# Patient Record
Sex: FEMALE | NOT HISPANIC OR LATINO | Employment: UNEMPLOYED | URBAN - METROPOLITAN AREA
[De-identification: names, ages, dates, MRNs, and addresses within clinical notes are randomized per-mention and may not be internally consistent; named-entity substitution may affect disease eponyms.]

---

## 2023-05-23 LAB
ALANINE AMINOTRANSFERASE (SGPT) (U/L) IN SER/PLAS: 17 U/L (ref 7–45)
ALBUMIN (G/DL) IN SER/PLAS: 4.4 G/DL (ref 3.4–5)
ALKALINE PHOSPHATASE (U/L) IN SER/PLAS: 48 U/L (ref 33–110)
ANION GAP IN SER/PLAS: 13 MMOL/L (ref 10–20)
ASPARTATE AMINOTRANSFERASE (SGOT) (U/L) IN SER/PLAS: 14 U/L (ref 9–39)
BILIRUBIN TOTAL (MG/DL) IN SER/PLAS: 0.7 MG/DL (ref 0–1.2)
CALCIUM (MG/DL) IN SER/PLAS: 9.5 MG/DL (ref 8.6–10.6)
CARBON DIOXIDE, TOTAL (MMOL/L) IN SER/PLAS: 33 MMOL/L (ref 21–32)
CHLORIDE (MMOL/L) IN SER/PLAS: 98 MMOL/L (ref 98–107)
CHOLESTEROL (MG/DL) IN SER/PLAS: 149 MG/DL (ref 0–199)
CHOLESTEROL IN HDL (MG/DL) IN SER/PLAS: 58 MG/DL
CHOLESTEROL/HDL RATIO: 2.6
CREATININE (MG/DL) IN SER/PLAS: 0.9 MG/DL (ref 0.5–1.05)
ERYTHROCYTE DISTRIBUTION WIDTH (RATIO) BY AUTOMATED COUNT: 12.9 % (ref 11.5–14.5)
ERYTHROCYTE MEAN CORPUSCULAR HEMOGLOBIN CONCENTRATION (G/DL) BY AUTOMATED: 32 G/DL (ref 32–36)
ERYTHROCYTE MEAN CORPUSCULAR VOLUME (FL) BY AUTOMATED COUNT: 91 FL (ref 80–100)
ERYTHROCYTES (10*6/UL) IN BLOOD BY AUTOMATED COUNT: 5.06 X10E12/L (ref 4–5.2)
ESTIMATED AVERAGE GLUCOSE FOR HBA1C: 126 MG/DL
GFR FEMALE: 73 ML/MIN/1.73M2
GLUCOSE (MG/DL) IN SER/PLAS: 99 MG/DL (ref 74–99)
HEMATOCRIT (%) IN BLOOD BY AUTOMATED COUNT: 45.9 % (ref 36–46)
HEMOGLOBIN (G/DL) IN BLOOD: 14.7 G/DL (ref 12–16)
HEMOGLOBIN A1C/HEMOGLOBIN TOTAL IN BLOOD: 6 %
LDL: 48 MG/DL (ref 0–99)
LEUKOCYTES (10*3/UL) IN BLOOD BY AUTOMATED COUNT: 5.5 X10E9/L (ref 4.4–11.3)
NON HDL CHOLESTEROL: 91 MG/DL
NRBC (PER 100 WBCS) BY AUTOMATED COUNT: 0 /100 WBC (ref 0–0)
PLATELETS (10*3/UL) IN BLOOD AUTOMATED COUNT: 198 X10E9/L (ref 150–450)
POTASSIUM (MMOL/L) IN SER/PLAS: 3.7 MMOL/L (ref 3.5–5.3)
PROTEIN TOTAL: 7.3 G/DL (ref 6.4–8.2)
SODIUM (MMOL/L) IN SER/PLAS: 140 MMOL/L (ref 136–145)
TRIGLYCERIDE (MG/DL) IN SER/PLAS: 216 MG/DL (ref 0–149)
UREA NITROGEN (MG/DL) IN SER/PLAS: 16 MG/DL (ref 6–23)
VLDL: 43 MG/DL (ref 0–40)

## 2023-05-25 ENCOUNTER — OFFICE VISIT (OUTPATIENT)
Dept: PRIMARY CARE | Facility: CLINIC | Age: 58
End: 2023-05-25
Payer: COMMERCIAL

## 2023-05-25 VITALS
HEIGHT: 65 IN | SYSTOLIC BLOOD PRESSURE: 124 MMHG | BODY MASS INDEX: 39.12 KG/M2 | WEIGHT: 234.8 LBS | DIASTOLIC BLOOD PRESSURE: 66 MMHG

## 2023-05-25 DIAGNOSIS — R73.03 BORDERLINE DIABETES: ICD-10-CM

## 2023-05-25 DIAGNOSIS — E78.2 MIXED HYPERLIPIDEMIA: ICD-10-CM

## 2023-05-25 DIAGNOSIS — I10 PRIMARY HYPERTENSION: Primary | ICD-10-CM

## 2023-05-25 PROBLEM — J32.9 SINUS INFECTION: Status: ACTIVE | Noted: 2023-05-25

## 2023-05-25 PROBLEM — M19.90 ARTHRITIS: Status: ACTIVE | Noted: 2023-05-25

## 2023-05-25 PROBLEM — E66.9 OBESITY, CLASS II, BMI 35-39.9: Status: ACTIVE | Noted: 2019-08-08

## 2023-05-25 PROBLEM — R21 RASH: Status: ACTIVE | Noted: 2023-05-25

## 2023-05-25 PROBLEM — Z96.651 STATUS POST RIGHT KNEE REPLACEMENT: Status: ACTIVE | Noted: 2019-08-15

## 2023-05-25 PROBLEM — G89.18 ACUTE POST-OPERATIVE PAIN: Status: ACTIVE | Noted: 2019-09-10

## 2023-05-25 PROBLEM — E66.812 OBESITY, CLASS II, BMI 35-39.9: Status: ACTIVE | Noted: 2019-08-08

## 2023-05-25 PROBLEM — R26.9 ABNORMALITY OF GAIT: Status: ACTIVE | Noted: 2019-09-10

## 2023-05-25 PROBLEM — E66.9 OBESITY (BMI 30-39.9): Status: ACTIVE | Noted: 2019-08-08

## 2023-05-25 PROBLEM — M17.11 PRIMARY OSTEOARTHRITIS OF RIGHT KNEE: Status: ACTIVE | Noted: 2020-09-28

## 2023-05-25 PROBLEM — M25.662 DECREASED ROM OF LEFT KNEE: Status: ACTIVE | Noted: 2019-09-10

## 2023-05-25 PROCEDURE — 99213 OFFICE O/P EST LOW 20 MIN: CPT | Performed by: INTERNAL MEDICINE

## 2023-05-25 PROCEDURE — 3074F SYST BP LT 130 MM HG: CPT | Performed by: INTERNAL MEDICINE

## 2023-05-25 PROCEDURE — 1036F TOBACCO NON-USER: CPT | Performed by: INTERNAL MEDICINE

## 2023-05-25 PROCEDURE — 3078F DIAST BP <80 MM HG: CPT | Performed by: INTERNAL MEDICINE

## 2023-05-25 RX ORDER — AMLODIPINE BESYLATE 10 MG/1
10 TABLET ORAL DAILY
COMMUNITY
End: 2023-05-25 | Stop reason: SDUPTHER

## 2023-05-25 RX ORDER — HYDROCHLOROTHIAZIDE 25 MG/1
1 TABLET ORAL DAILY
COMMUNITY
Start: 2022-02-26 | End: 2023-05-25 | Stop reason: SDUPTHER

## 2023-05-25 RX ORDER — AMLODIPINE BESYLATE 10 MG/1
10 TABLET ORAL DAILY
Qty: 90 TABLET | Refills: 1 | Status: SHIPPED | OUTPATIENT
Start: 2023-05-25 | End: 2023-11-30 | Stop reason: SDUPTHER

## 2023-05-25 RX ORDER — HYDROCHLOROTHIAZIDE 25 MG/1
25 TABLET ORAL DAILY
Qty: 90 TABLET | Refills: 1 | Status: SHIPPED | OUTPATIENT
Start: 2023-05-25 | End: 2023-11-30 | Stop reason: SDUPTHER

## 2023-05-25 RX ORDER — ATORVASTATIN CALCIUM 10 MG/1
10 TABLET, FILM COATED ORAL DAILY
COMMUNITY
Start: 2023-02-09 | End: 2023-05-25 | Stop reason: SDUPTHER

## 2023-05-25 RX ORDER — ATENOLOL 100 MG/1
100 TABLET ORAL DAILY
COMMUNITY
End: 2023-05-25 | Stop reason: SDUPTHER

## 2023-05-25 RX ORDER — ATENOLOL 100 MG/1
100 TABLET ORAL DAILY
Qty: 90 TABLET | Refills: 1 | Status: SHIPPED | OUTPATIENT
Start: 2023-05-25 | End: 2023-11-30 | Stop reason: SDUPTHER

## 2023-05-25 RX ORDER — ATORVASTATIN CALCIUM 10 MG/1
10 TABLET, FILM COATED ORAL DAILY
Qty: 90 TABLET | Refills: 1 | Status: SHIPPED | OUTPATIENT
Start: 2023-05-25 | End: 2023-11-30 | Stop reason: SINTOL

## 2023-05-25 RX ORDER — METFORMIN HYDROCHLORIDE 500 MG/1
500 TABLET ORAL
Qty: 90 TABLET | Refills: 1 | Status: SHIPPED | OUTPATIENT
Start: 2023-05-25 | End: 2023-11-30 | Stop reason: SDUPTHER

## 2023-05-25 NOTE — PROGRESS NOTES
"Subjective   Patient ID: Nakita Llamas is a 57 y.o. female who presents for Med Refill (Refills, bloodwork results).    HPI   Patient is here for follow-up on hypertension  She has gained more weight  Not able to watch diet and exercise  Did not do blood work could not do CT cardiac scoring     past recap  Establish PCP  For physical  Patient has multiple concerns  Feels sinus infections coming for past 3 weeks Covid was negative not having had cold or postnasal drainage she did got treatment with amoxicillin from urgent care still she is having colored mucus which cleared up for a while  Complaining of rash     Past medical history: Bilateral total knee replacement 2019 in 2020, hypertension  Social history: Quit smoking 9 years ago ,2 drinks a night , vodka  Family history: Father committed suicide mother  of coronary artery disease at age 65 Brother  of brain aneurysm at 48   Review of Systems    Objective   /66   Ht 1.651 m (5' 5\")   Wt 107 kg (234 lb 12.8 oz)   BMI 39.07 kg/m²     Physical Exam  Vitals reviewed.   Constitutional:       Appearance: Normal appearance.   HENT:      Head: Normocephalic and atraumatic.      Right Ear: Tympanic membrane, ear canal and external ear normal.      Left Ear: Tympanic membrane, ear canal and external ear normal.      Nose: Nose normal.      Mouth/Throat:      Pharynx: Oropharynx is clear.   Eyes:      Extraocular Movements: Extraocular movements intact.      Conjunctiva/sclera: Conjunctivae normal.      Pupils: Pupils are equal, round, and reactive to light.   Cardiovascular:      Rate and Rhythm: Normal rate and regular rhythm.      Pulses: Normal pulses.      Heart sounds: Normal heart sounds.   Pulmonary:      Effort: Pulmonary effort is normal.      Breath sounds: Normal breath sounds.   Abdominal:      General: Abdomen is flat. Bowel sounds are normal.      Palpations: Abdomen is soft.   Musculoskeletal:      Cervical back: Normal range " of motion and neck supple.   Skin:     General: Skin is warm and dry.   Neurological:      General: No focal deficit present.      Mental Status: She is alert and oriented to person, place, and time.   Psychiatric:         Mood and Affect: Mood normal.         Assessment/Plan   Problem List Items Addressed This Visit          Circulatory    Hypertension - Primary    Relevant Medications    amLODIPine (Norvasc) 10 mg tablet    atenolol (Tenormin) 100 mg tablet    hydroCHLOROthiazide (HYDRODiuril) 25 mg tablet    Other Relevant Orders    CBC    Comprehensive Metabolic Panel    TSH with reflex to Free T4 if abnormal       Endocrine/Metabolic    Borderline diabetes    Relevant Medications    metFORMIN (Glucophage) 500 mg tablet    Other Relevant Orders    Hemoglobin A1C    TSH with reflex to Free T4 if abnormal       Other    Mixed hyperlipidemia    Relevant Medications    atorvastatin (Lipitor) 10 mg tablet    Other Relevant Orders    Lipid Panel        past recap  Physical plan  EKG normal sinus rhythm  Routine blood work ordered  Z-Spencer for sinus infection but clinically sinuses does not look very congested  Spectazole cream for the rash which appears to be fungal  Bone density ordered  Screening colonoscopy  CT cardiac scoring  Follow-up after the work-up  Encourage patient to cut down on drinking     8/15  Patient's blood pressure is elevated because she missed her blood pressure medication  Restart blood pressure medication  Advised patient to follow-up after doing the blood work  Also encouraged to do CT cardiac scoring colonoscopy bone density  Discussed diet exercise and encouraged to lose weight     11/15/22  Patient has gained more weight since last visit  Discussed diet lifestyle  High risk for heart disease in the family  Again encouraged to do CT cardiac scoring  Cholesterol high start Lipitor  Blood sugar is little high discussed low-carb diet  Follow-up in 6 months    5/25/2023  Blood work reviewed A1c 6  triglycerides 216 LDL 48  Continue metformin  Continue Lipitor  Triglycerides high discussed dietary changes  Blood pressure stable  Patient refusing GYN exam  Again emphasized to do CT cardiac scoring  Follow-up in 6 months

## 2023-11-30 ENCOUNTER — OFFICE VISIT (OUTPATIENT)
Dept: PRIMARY CARE | Facility: CLINIC | Age: 58
End: 2023-11-30
Payer: COMMERCIAL

## 2023-11-30 VITALS
DIASTOLIC BLOOD PRESSURE: 60 MMHG | SYSTOLIC BLOOD PRESSURE: 124 MMHG | WEIGHT: 226.4 LBS | BODY MASS INDEX: 37.72 KG/M2 | HEIGHT: 65 IN

## 2023-11-30 DIAGNOSIS — I10 PRIMARY HYPERTENSION: ICD-10-CM

## 2023-11-30 DIAGNOSIS — R73.03 BORDERLINE DIABETES: ICD-10-CM

## 2023-11-30 DIAGNOSIS — Z00.00 HEALTH CARE MAINTENANCE: ICD-10-CM

## 2023-11-30 PROBLEM — N94.3 PREMENSTRUAL TENSION SYNDROME: Status: ACTIVE | Noted: 2023-11-30

## 2023-11-30 PROCEDURE — 3074F SYST BP LT 130 MM HG: CPT | Performed by: INTERNAL MEDICINE

## 2023-11-30 PROCEDURE — 1036F TOBACCO NON-USER: CPT | Performed by: INTERNAL MEDICINE

## 2023-11-30 PROCEDURE — 99396 PREV VISIT EST AGE 40-64: CPT | Performed by: INTERNAL MEDICINE

## 2023-11-30 PROCEDURE — 3078F DIAST BP <80 MM HG: CPT | Performed by: INTERNAL MEDICINE

## 2023-11-30 RX ORDER — HYDROCHLOROTHIAZIDE 25 MG/1
25 TABLET ORAL DAILY
Qty: 90 TABLET | Refills: 1 | Status: SHIPPED | OUTPATIENT
Start: 2023-11-30

## 2023-11-30 RX ORDER — AMLODIPINE BESYLATE 10 MG/1
10 TABLET ORAL DAILY
Qty: 90 TABLET | Refills: 1 | Status: SHIPPED | OUTPATIENT
Start: 2023-11-30

## 2023-11-30 RX ORDER — METFORMIN HYDROCHLORIDE 500 MG/1
500 TABLET ORAL
Qty: 90 TABLET | Refills: 1 | Status: SHIPPED | OUTPATIENT
Start: 2023-11-30 | End: 2024-11-29

## 2023-11-30 RX ORDER — ATENOLOL 100 MG/1
100 TABLET ORAL DAILY
Qty: 90 TABLET | Refills: 1 | Status: SHIPPED | OUTPATIENT
Start: 2023-11-30

## 2023-11-30 ASSESSMENT — ENCOUNTER SYMPTOMS
LOSS OF SENSATION IN FEET: 0
DEPRESSION: 0
OCCASIONAL FEELINGS OF UNSTEADINESS: 0

## 2023-11-30 NOTE — PROGRESS NOTES
"Subjective   Patient ID: Nakita Llamas is a 58 y.o. female who presents for Med Refill.    HPI   Patient here for physical   patient is here for follow-up of hypertension diabetes   lost weight from 2 .4   Trying to watch diet    patient is here for follow-up on hypertension  She has gained more weight  Not able to watch diet and exercise  Did not do blood work could not do CT cardiac scoring     past recap  Establish PCP  For physical  Patient has multiple concerns  Feels sinus infections coming for past 3 weeks Covid was negative not having had cold or postnasal drainage she did got treatment with amoxicillin from urgent care still she is having colored mucus which cleared up for a while  Complaining of rash     Past medical history: Bilateral total knee replacement 2019 in 2020, hypertension  Social history: Quit smoking 9 years ago ,2 drinks a night , vodka  Family history: Father committed suicide mother  of coronary artery disease at age 65 Brother  of brain aneurysm at 48   Review of Systems    Objective   /60   Ht 1.651 m (5' 5\")   Wt 103 kg (226 lb 6.4 oz)   BMI 37.67 kg/m²     Physical Exam  Vitals reviewed.   Constitutional:       Appearance: Normal appearance.   HENT:      Head: Normocephalic and atraumatic.      Right Ear: Tympanic membrane, ear canal and external ear normal.      Left Ear: Tympanic membrane, ear canal and external ear normal.      Nose: Nose normal.      Mouth/Throat:      Pharynx: Oropharynx is clear.   Eyes:      Extraocular Movements: Extraocular movements intact.      Conjunctiva/sclera: Conjunctivae normal.      Pupils: Pupils are equal, round, and reactive to light.   Cardiovascular:      Rate and Rhythm: Normal rate and regular rhythm.      Pulses: Normal pulses.      Heart sounds: Normal heart sounds.   Pulmonary:      Effort: Pulmonary effort is normal.      Breath sounds: Normal breath sounds.   Abdominal:      General: Abdomen is flat. " Bowel sounds are normal.      Palpations: Abdomen is soft.   Musculoskeletal:      Cervical back: Normal range of motion and neck supple.   Skin:     General: Skin is warm and dry.   Neurological:      General: No focal deficit present.      Mental Status: She is alert and oriented to person, place, and time.   Psychiatric:         Mood and Affect: Mood normal.         Assessment/Plan   Problem List Items Addressed This Visit          Cardiac and Vasculature    Hypertension    Relevant Medications    amLODIPine (Norvasc) 10 mg tablet    atenolol (Tenormin) 100 mg tablet    hydroCHLOROthiazide (HYDRODiuril) 25 mg tablet       Endocrine/Metabolic    Borderline diabetes    Relevant Medications    metFORMIN (Glucophage) 500 mg tablet     Other Visit Diagnoses       Health care maintenance        Relevant Orders    CT cardiac scoring wo IV contrast           past recap  Physical plan  EKG normal sinus rhythm  Routine blood work ordered  Z-Spencer for sinus infection but clinically sinuses does not look very congested  Spectazole cream for the rash which appears to be fungal  Bone density ordered  Screening colonoscopy  CT cardiac scoring  Follow-up after the work-up  Encourage patient to cut down on drinking     8/15  Patient's blood pressure is elevated because she missed her blood pressure medication  Restart blood pressure medication  Advised patient to follow-up after doing the blood work  Also encouraged to do CT cardiac scoring colonoscopy bone density  Discussed diet exercise and encouraged to lose weight     11/15/22  Patient has gained more weight since last visit  Discussed diet lifestyle  High risk for heart disease in the family  Again encouraged to do CT cardiac scoring  Cholesterol high start Lipitor  Blood sugar is little high discussed low-carb diet  Follow-up in 6 months    5/25/2023  Blood work reviewed A1c 6 triglycerides 216 LDL 48  Continue metformin  Continue Lipitor  Triglycerides high discussed  dietary changes  Blood pressure stable  Patient refusing GYN exam  Again emphasized to do CT cardiac scoring  Follow-up in 6 months    11/30/2023  Physical normal   A1c has gone down to 6   cholesterol is well-controlled  Blood pressure is good  Appreciate for losing weight  Refills given  Bone density ordered   advised patient to schedule GYN exam

## 2024-06-27 DIAGNOSIS — I10 PRIMARY HYPERTENSION: ICD-10-CM

## 2024-06-27 RX ORDER — ATENOLOL 100 MG/1
100 TABLET ORAL DAILY
Qty: 90 TABLET | Refills: 1 | Status: CANCELLED | OUTPATIENT
Start: 2024-06-27

## 2024-06-28 DIAGNOSIS — I10 PRIMARY HYPERTENSION: ICD-10-CM

## 2024-06-28 RX ORDER — ATENOLOL 100 MG/1
100 TABLET ORAL DAILY
Qty: 90 TABLET | Refills: 1 | OUTPATIENT
Start: 2024-06-28

## 2024-07-01 RX ORDER — AMLODIPINE BESYLATE 10 MG/1
10 TABLET ORAL DAILY
Qty: 90 TABLET | Refills: 1 | OUTPATIENT
Start: 2024-07-01

## 2024-07-02 RX ORDER — ATENOLOL 100 MG/1
100 TABLET ORAL DAILY
Qty: 60 TABLET | Refills: 0 | Status: SHIPPED | OUTPATIENT
Start: 2024-07-02

## 2024-07-02 RX ORDER — AMLODIPINE BESYLATE 10 MG/1
10 TABLET ORAL DAILY
Qty: 30 TABLET | Refills: 0 | Status: SHIPPED | OUTPATIENT
Start: 2024-07-02

## 2024-07-02 RX ORDER — HYDROCHLOROTHIAZIDE 25 MG/1
25 TABLET ORAL DAILY
Qty: 30 TABLET | Refills: 0 | Status: SHIPPED | OUTPATIENT
Start: 2024-07-02

## 2024-12-18 ENCOUNTER — OFFICE VISIT (OUTPATIENT)
Dept: URGENT CARE | Age: 59
End: 2024-12-18

## 2024-12-18 VITALS
HEART RATE: 94 BPM | BODY MASS INDEX: 37.09 KG/M2 | SYSTOLIC BLOOD PRESSURE: 135 MMHG | HEIGHT: 65 IN | TEMPERATURE: 99 F | DIASTOLIC BLOOD PRESSURE: 71 MMHG | WEIGHT: 222.6 LBS | OXYGEN SATURATION: 97 % | RESPIRATION RATE: 20 BRPM

## 2024-12-18 DIAGNOSIS — Z20.822 COVID-19 VIRUS NOT DETECTED: ICD-10-CM

## 2024-12-18 DIAGNOSIS — J02.0 STREP PHARYNGITIS: Primary | ICD-10-CM

## 2024-12-18 LAB
POC RAPID INFLUENZA A: NEGATIVE
POC RAPID INFLUENZA B: NEGATIVE
POC RAPID STREP: POSITIVE
POC SARS-COV-2 AG BINAX: NORMAL

## 2024-12-18 PROCEDURE — 1036F TOBACCO NON-USER: CPT

## 2024-12-18 PROCEDURE — 3078F DIAST BP <80 MM HG: CPT

## 2024-12-18 PROCEDURE — 87880 STREP A ASSAY W/OPTIC: CPT

## 2024-12-18 PROCEDURE — 87811 SARS-COV-2 COVID19 W/OPTIC: CPT

## 2024-12-18 PROCEDURE — 3008F BODY MASS INDEX DOCD: CPT

## 2024-12-18 PROCEDURE — 99204 OFFICE O/P NEW MOD 45 MIN: CPT

## 2024-12-18 PROCEDURE — 87804 INFLUENZA ASSAY W/OPTIC: CPT

## 2024-12-18 PROCEDURE — 3075F SYST BP GE 130 - 139MM HG: CPT

## 2024-12-18 RX ORDER — AMOXICILLIN 500 MG/1
500 CAPSULE ORAL 2 TIMES DAILY
Qty: 20 CAPSULE | Refills: 0 | Status: SHIPPED | OUTPATIENT
Start: 2024-12-18 | End: 2024-12-28

## 2024-12-18 ASSESSMENT — PAIN SCALES - GENERAL: PAINLEVEL_OUTOF10: 5

## 2024-12-18 NOTE — LETTER
December 18, 2024     Patient: Nakita Llamas   YOB: 1965   Date of Visit: 12/18/2024       To Whom It May Concern:    It is my medical opinion that Nakita Llamas may return to work on 12/20/2024 .    If you have any questions or concerns, please don't hesitate to call.         Sincerely,        Jesús Duckworth PA-C    CC: No Recipients

## 2024-12-18 NOTE — PROGRESS NOTES
Subjective   Patient ID: Nakita Llamas is a 59 y.o. female. They present today with a chief complaint of Sore Throat, Fever, Earache, Headache, and Vomiting.    History of Present Illness  59-year-old female presents urgent care for complaint of sore throat, intermittent fevers, intermittent earache as well as episodes of vomiting yesterday that she states occurred due to her sore throat and congestion.  States she does have some tender lymphadenopathy in her neck.  States she only has occasional cough.  Denies any current nausea or vomiting, sweats, chest pain, shortness of breath, abdominal pain.  States she was exposed to her grandson recently who is a carrier of strep and was sick.  Rapid strep positive.  COVID and flu negative.  Denies any drug allergies.  Prescribed amoxicillin.  Educated on supportive care.  Follow-up PCP in 1 to 2 weeks.  Return/ER precautions.  Patient agrees with plan.          Past Medical History  Allergies as of 2024 - Reviewed 2024   Allergen Reaction Noted    Adhesive tape-silicones Rash 2023       (Not in a hospital admission)       No past medical history on file.    Past Surgical History:   Procedure Laterality Date    OTHER SURGICAL HISTORY  2022     section    OTHER SURGICAL HISTORY  2022    Jaw surgery    OTHER SURGICAL HISTORY  2022    Knee replacement        reports that she has never smoked. She has never used smokeless tobacco. She reports current alcohol use. She reports current drug use. Drug: Marijuana.    Review of Systems  Review of Systems   All other systems reviewed and are negative.                                 Objective    Vitals:    24 1034   TempSrc: Oral     No LMP recorded.    Physical Exam  Vitals reviewed.   Constitutional:       General: She is not in acute distress.     Appearance: Normal appearance. She is not ill-appearing, toxic-appearing or diaphoretic.   HENT:      Head: Normocephalic and  atraumatic.      Right Ear: Tympanic membrane, ear canal and external ear normal.      Left Ear: Tympanic membrane, ear canal and external ear normal.      Nose: Congestion present.      Mouth/Throat:      Mouth: Mucous membranes are moist.      Comments: Pharynx and tonsils mildly erythematous without exudate.  Soft palate petechial rash.  Uvula midline.  Cardiovascular:      Rate and Rhythm: Normal rate and regular rhythm.   Pulmonary:      Effort: Pulmonary effort is normal. No respiratory distress.      Breath sounds: Normal breath sounds. No stridor. No wheezing, rhonchi or rales.   Abdominal:      General: Abdomen is flat.      Palpations: Abdomen is soft.      Tenderness: There is no abdominal tenderness. There is no right CVA tenderness or left CVA tenderness.   Musculoskeletal:      Cervical back: Normal range of motion and neck supple. Tenderness present. No rigidity.   Lymphadenopathy:      Cervical: Cervical adenopathy present.   Skin:     General: Skin is warm and dry.   Neurological:      General: No focal deficit present.      Mental Status: She is alert and oriented to person, place, and time.   Psychiatric:         Mood and Affect: Mood normal.         Behavior: Behavior normal.         Procedures    Point of Care Test & Imaging Results from this visit  No results found for this visit on 12/18/24.   No results found.    Diagnostic study results (if any) were reviewed by eJsús Duckworth PA-C.    Assessment/Plan   Allergies, medications, history, and pertinent labs/EKGs/Imaging reviewed by Jesús Duckworth PA-C.     Medical Decision Making  59-year-old female presents urgent care for complaint of sore throat, intermittent fevers, intermittent earache as well as episodes of vomiting yesterday that she states occurred due to her sore throat and congestion.  States she does have some tender lymphadenopathy in her neck.  States she only has occasional cough.  Denies any current nausea or vomiting,  sweats, chest pain, shortness of breath, abdominal pain.  States she was exposed to her grandson recently who is a carrier of strep and was sick.  Rapid strep positive.  COVID and flu negative.  Denies any drug allergies.  Prescribed amoxicillin.  Educated on supportive care.  Follow-up PCP in 1 to 2 weeks.  Return/ER precautions.  Patient agrees with plan.    Orders and Diagnoses  Diagnoses and all orders for this visit:  Fever, unspecified fever cause  Sore throat      Medical Admin Record      Patient disposition: Home    Electronically signed by Jesús Duckworth PA-C  10:36 AM

## 2024-12-18 NOTE — PATIENT INSTRUCTIONS
Take medication as prescribed.  Maintain adequate hydration and nutrition.  If symptoms worsen, do not improve, or any other concerning or worrisome symptoms develop please return to the clinic or go to the emergency department.  Follow-up with PCP in 1 to 2 weeks.